# Patient Record
Sex: FEMALE | Race: WHITE
[De-identification: names, ages, dates, MRNs, and addresses within clinical notes are randomized per-mention and may not be internally consistent; named-entity substitution may affect disease eponyms.]

---

## 2017-01-28 ENCOUNTER — HOSPITAL ENCOUNTER (EMERGENCY)
Dept: HOSPITAL 62 - ER | Age: 37
Discharge: HOME | End: 2017-01-28
Payer: COMMERCIAL

## 2017-01-28 VITALS — SYSTOLIC BLOOD PRESSURE: 142 MMHG | DIASTOLIC BLOOD PRESSURE: 55 MMHG

## 2017-01-28 DIAGNOSIS — H10.31: Primary | ICD-10-CM

## 2017-01-28 DIAGNOSIS — J34.89: ICD-10-CM

## 2017-01-28 PROCEDURE — 99283 EMERGENCY DEPT VISIT LOW MDM: CPT

## 2017-01-28 NOTE — ER DOCUMENT REPORT
ED Eye Complaint





- General


Chief Complaint: Eye Pain


Stated Complaint: EYE PAIN,CONGESTION


Mode of Arrival: Ambulatory


TRAVEL OUTSIDE OF THE [place] IN LAST 30 DAYS: No





- Related Data


Allergies/Adverse Reactions: 


 





codeine Allergy (Verified 01/28/17 18:08)


 











Past Medical History





- General


Information source: Patient





- Social History


Smoking Status: Never Smoker


Chew tobacco use (# tins/day): No


Frequency of alcohol use: None


Drug Abuse: None


Family History: Reviewed & Not Pertinent


Patient has suicidal ideation: No


Patient has homicidal ideation: No


Renal/ Medical History: Denies: Hx Peritoneal Dialysis


Psychiatric Medical History: Reports: Hx Anxiety


Past Surgical History: Reports: Hx Orthopedic Surgery - Bilateral knees





- Immunizations


Hx Diphtheria, Pertussis, Tetanus Vaccination: No





Physical Exam





- Vital signs


Vitals: 





 











Temp Pulse Resp BP Pulse Ox


 


 98.2 F   88   20   153/95 H  97 


 


 01/28/17 17:45  01/28/17 17:45  01/28/17 17:45  01/28/17 17:45  01/28/17 17:45














- HEENT


Head: Normocephalic, Atraumatic


Eyes: No: Pale conjunctiva, Periorbital ecchymosis, Periorbital edema


Conjunctiva: Injected


Cornea: Normal.  No: Corneal abrasion, Corneal ulcer, Embedded foreign body, 

Flourescein stain uptake, Opacified, Superficial foreign body


Extraocular movements intact: Yes


Eyelashes: Normal


Pupils: PERRL


Visual acuity- Right eye: 20/50


Visual acuity- Left eye: 20/25


Visual acuity- Both eyes: 20/25


Corrective lenses worn: No


Fundascopic: Normal.  No: Retinal detachment, Retinal hemorrhage


Visual fields normal: Yes


Ears: Normal


External canal: Normal


Tympanic membrane: Normal


Sinus: Frontal, Maxillary, Tenderness


Nasal: Clear rhinorrhea.  No: Bloody discharge, Ecchymosis, Epistaxis, Purulent 

discharge, Swelling


Pharynx: Normal.  No: Erythema, Exudate, Peritonsillar abscess, Post nasal 

drainage, Retropharyngeal abscess


Neck: Normal.  No: Anterior cervical chain, Posterior cervical chain, 

Lymphadenopathy


Notes: 


no pain to palpation of the temporal arteries





Course





- Re-evaluation


Re-evalutation: 





01/28/17 20:38


no evidence of PITTS or RVO do to intact vision. given drainage and appearance, 

will treat for conjunctivitis and instruc ther to follow up with opthamology on 

tuesday





- Vital Signs


Vital signs: 





 











Temp Pulse Resp BP Pulse Ox


 


 98.2 F   88   20   153/95 H  97 


 


 01/28/17 17:45  01/28/17 17:45  01/28/17 17:45  01/28/17 17:45  01/28/17 17:45














Discharge





- Discharge


Clinical Impression: 


Conjunctivitis


Qualifiers:


 Conjunctivitis type: acute Acute conjunctivitis type: unspecified Laterality: 

right Qualified Code(s): H10.31 - Unspecified acute conjunctivitis, right eye





Condition: Good


Disposition: HOME, SELF-CARE


Additional Instructions: 


Conjunctivitis





     You have an infection in your eye, commonly known as "pink eye." 

Conjunctivitis causes redness, mild discomfort, itching, and mattering on the 

eyelids.  It is very contagious, so you must be careful to wash your hands 

after touching your face so you don't pass the infection on to others.


     Conjunctivitis is caused by both viruses and bacteria.  It usually 

responds quickly to treatment with antibiotic drops.  These should be placed in 

the eye as prescribed (usually every three to four hours while you're awake).  

If you wear contact lenses, don't put them in your eyes until the infection is 

cleared and you are no longer using the drops (unless your doctor advises you 

otherwise).


     Should you develop increasing eye pain, severe swelling, decreased vision, 

or fail to improve as expected, please return for re-examination.











Continue taking amoxicillin was prescribed for sinus infection.  Follow-up with 

ophthalmology as scheduled


Prescriptions: 


Ketorolac Tromethamine [Acular] 1 drop OD QIDP PRN #5 ml


 PRN Reason: 


Ofloxacin 5 ml OP QID #1 bottle


Forms:  Elevated Blood Pressure

## 2017-01-28 NOTE — ER DOCUMENT REPORT
ED Medical Screen (RME)





- General


Stated Complaint: EYE PAIN,CONGESTION


Time seen by provider: 18:06


Mode of Arrival: Ambulatory


Information source: Patient


Notes: 


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 

haziness to her right eye.  It originally treated for pinkeye and then a sinus 

infection twice by [facility] over the past 2 weeks.  every morning it 

is crusted and pus has come out from the medial canthus of the eye.  sHe has an 

appointment with an eye doctor on Tuesday.  The whole right side of her face 

hurts.





I have greeted and performed a rapid initial assessment of this patient.  A 

comprehensive ED assessment, evaluation of the patient, analysis of test results

, and completion of the medical decision making process will be contacted by 

additional ED providers.


TRAVEL OUTSIDE OF THE [place] IN LAST 30 DAYS: No





- Related Data


Allergies/Adverse Reactions: 


 





codeine Allergy (Verified 12/07/16 11:53)


 











Past Medical History


Psychiatric Medical History: Reports: Hx Anxiety





- Immunizations


Hx Diphtheria, Pertussis, Tetanus Vaccination: No





Physical Exam





- Vital signs


Vitals: 





 











Temp Pulse Resp BP Pulse Ox


 


 98.2 F   88   20   153/95 H  97 


 


 01/28/17 17:45  01/28/17 17:45  01/28/17 17:45  01/28/17 17:45  01/28/17 17:45














Course





- Vital Signs


Vital signs: 





 











Temp Pulse Resp BP Pulse Ox


 


 98.2 F   88   20   153/95 H  97 


 


 01/28/17 17:45  01/28/17 17:45  01/28/17 17:45  01/28/17 17:45  01/28/17 17:45

## 2017-09-18 ENCOUNTER — HOSPITAL ENCOUNTER (EMERGENCY)
Dept: HOSPITAL 62 - ER | Age: 37
Discharge: HOME | End: 2017-09-18
Payer: COMMERCIAL

## 2017-09-18 VITALS — DIASTOLIC BLOOD PRESSURE: 70 MMHG | SYSTOLIC BLOOD PRESSURE: 124 MMHG

## 2017-09-18 DIAGNOSIS — I10: ICD-10-CM

## 2017-09-18 DIAGNOSIS — R51: ICD-10-CM

## 2017-09-18 DIAGNOSIS — R07.89: Primary | ICD-10-CM

## 2017-09-18 DIAGNOSIS — Z88.5: ICD-10-CM

## 2017-09-18 DIAGNOSIS — Z86.718: ICD-10-CM

## 2017-09-18 LAB
ALBUMIN SERPL-MCNC: 4.1 G/DL (ref 3.5–5)
ALP SERPL-CCNC: 87 U/L (ref 38–126)
ALT SERPL-CCNC: 29 U/L (ref 9–52)
ANION GAP SERPL CALC-SCNC: 11 MMOL/L (ref 5–19)
AST SERPL-CCNC: 18 U/L (ref 14–36)
BASOPHILS # BLD AUTO: 0 10^3/UL (ref 0–0.2)
BASOPHILS NFR BLD AUTO: 0.3 % (ref 0–2)
BILIRUB DIRECT SERPL-MCNC: 0.3 MG/DL (ref 0–0.4)
BILIRUB SERPL-MCNC: 0.4 MG/DL (ref 0.2–1.3)
BUN SERPL-MCNC: 26 MG/DL (ref 7–20)
CALCIUM: 9.3 MG/DL (ref 8.4–10.2)
CHLORIDE SERPL-SCNC: 102 MMOL/L (ref 98–107)
CK MB SERPL-MCNC: 1.16 NG/ML (ref ?–4.55)
CK SERPL-CCNC: 131 U/L (ref 30–135)
CO2 SERPL-SCNC: 28 MMOL/L (ref 22–30)
CREAT SERPL-MCNC: 0.98 MG/DL (ref 0.52–1.25)
EOSINOPHIL # BLD AUTO: 0.2 10^3/UL (ref 0–0.6)
EOSINOPHIL NFR BLD AUTO: 2.7 % (ref 0–6)
ERYTHROCYTE [DISTWIDTH] IN BLOOD BY AUTOMATED COUNT: 14.6 % (ref 11.5–14)
GLUCOSE SERPL-MCNC: 89 MG/DL (ref 75–110)
HCT VFR BLD CALC: 36.4 % (ref 36–47)
HGB BLD-MCNC: 12.3 G/DL (ref 12–15.5)
HGB HCT DIFFERENCE: 0.5
LYMPHOCYTES # BLD AUTO: 1.8 10^3/UL (ref 0.5–4.7)
LYMPHOCYTES NFR BLD AUTO: 28.7 % (ref 13–45)
MCH RBC QN AUTO: 29 PG (ref 27–33.4)
MCHC RBC AUTO-ENTMCNC: 33.8 G/DL (ref 32–36)
MCV RBC AUTO: 86 FL (ref 80–97)
MONOCYTES # BLD AUTO: 0.4 10^3/UL (ref 0.1–1.4)
MONOCYTES NFR BLD AUTO: 5.9 % (ref 3–13)
NEUTROPHILS # BLD AUTO: 4 10^3/UL (ref 1.7–8.2)
NEUTS SEG NFR BLD AUTO: 62.4 % (ref 42–78)
POTASSIUM SERPL-SCNC: 3.3 MMOL/L (ref 3.6–5)
PROT SERPL-MCNC: 7.3 G/DL (ref 6.3–8.2)
RBC # BLD AUTO: 4.25 10^6/UL (ref 3.72–5.28)
SODIUM SERPL-SCNC: 141.3 MMOL/L (ref 137–145)
TROPONIN I SERPL-MCNC: < 0.012 NG/ML
WBC # BLD AUTO: 6.4 10^3/UL (ref 4–10.5)

## 2017-09-18 PROCEDURE — 99285 EMERGENCY DEPT VISIT HI MDM: CPT

## 2017-09-18 PROCEDURE — 85379 FIBRIN DEGRADATION QUANT: CPT

## 2017-09-18 PROCEDURE — 96375 TX/PRO/DX INJ NEW DRUG ADDON: CPT

## 2017-09-18 PROCEDURE — 93010 ELECTROCARDIOGRAM REPORT: CPT

## 2017-09-18 PROCEDURE — 82553 CREATINE MB FRACTION: CPT

## 2017-09-18 PROCEDURE — 82550 ASSAY OF CK (CPK): CPT

## 2017-09-18 PROCEDURE — 93005 ELECTROCARDIOGRAM TRACING: CPT

## 2017-09-18 PROCEDURE — 36415 COLL VENOUS BLD VENIPUNCTURE: CPT

## 2017-09-18 PROCEDURE — 84484 ASSAY OF TROPONIN QUANT: CPT

## 2017-09-18 PROCEDURE — 96374 THER/PROPH/DIAG INJ IV PUSH: CPT

## 2017-09-18 PROCEDURE — 96361 HYDRATE IV INFUSION ADD-ON: CPT

## 2017-09-18 PROCEDURE — 71010: CPT

## 2017-09-18 PROCEDURE — 80053 COMPREHEN METABOLIC PANEL: CPT

## 2017-09-18 PROCEDURE — 83735 ASSAY OF MAGNESIUM: CPT

## 2017-09-18 PROCEDURE — 85025 COMPLETE CBC W/AUTO DIFF WBC: CPT

## 2017-09-18 NOTE — RADIOLOGY REPORT (SQ)
EXAM DESCRIPTION:  CHEST SINGLE VIEW



COMPLETED DATE/TIME:  9/18/2017 4:48 pm



REASON FOR STUDY:  chest pain



COMPARISON:  None.



EXAM PARAMETERS:  NUMBER OF VIEWS: One view.

TECHNIQUE: Single frontal radiographic view of the chest acquired.

RADIATION DOSE: NA

LIMITATIONS: None.



FINDINGS:  LUNGS AND PLEURA: No opacities, masses or pneumothorax. No pleural effusion.

MEDIASTINUM AND HILAR STRUCTURES: No masses.  Contour normal.

HEART AND VASCULAR STRUCTURES: Heart normal in size.  Normal vasculature.

BONES: No acute findings.

HARDWARE: None in the chest.

OTHER: No other significant finding.



IMPRESSION:  NO ACUTE RADIOGRAPHIC FINDING IN THE CHEST.



TECHNICAL DOCUMENTATION:  JOB ID:  [id number]

## 2017-09-18 NOTE — ER DOCUMENT REPORT
ED Medical Screen (RME)





- General


Chief Complaint: Chest Pain


Stated Complaint: CHEST PAIN


Time Seen by Provider: 17 15:47


Mode of Arrival: Ambulatory


Information source: Patient


Notes: 





37-year-old female history of anxiety presents with complaints of chest pain 

over the past week pain to left arm.  Patient admits to history of a DVT after 

delivery of twins. 





Patient notes similar chest pain 4 years ago and was admitted overnight had a 

stress test was normal








I have greeted and performed a rapid initial assessment of this patient.  A 

comprehensive ED assessment and evaluation of the patient, analysis of test 

results and completion of the medical decision making process will be conducted 

by additional ED providers.





PHYSICAL EXAMINATION:





GENERAL: Well-appearing, well-nourished and in no acute distress.





HEAD: Atraumatic, normocephalic.





EYES: Pupils equal round extraocular movements intact,  conjunctiva are normal.





ENT: Nares patent





NECK: Normal range of motion





LUNGS: No respiratory distress





Musculoskeletal: Normal range of motion





NEUROLOGICAL:  Normal speech, normal gait. 





PSYCH: Normal mood, normal affect.





SKIN: Warm, Dry, normal turgor, no rashes or lesions noted.


TRAVEL OUTSIDE OF THE [place] IN LAST 30 DAYS: No





- Related Data


Allergies/Adverse Reactions: 


 





codeine Allergy (Verified 17 15:34)


 











Past Medical History





- Social History


Chew tobacco use (# tins/day): No


Frequency of alcohol use: None


Drug Abuse: None





- Past Medical History


Cardiac Medical History: Reports: Hx Hypertension


Renal/ Medical History: Denies: Hx Peritoneal Dialysis


Psychiatric Medical History: Reports: Hx Anxiety


Past Surgical History: Reports: Hx  Section - 2, Hx Orthopedic Surgery 

- Bilateral knees and rib removal





- Immunizations


Hx Diphtheria, Pertussis, Tetanus Vaccination: No





Physical Exam





- Vital signs


Vitals: 





 











Temp Pulse Resp BP Pulse Ox


 


 98.1 F   78   18   149/90 H  99 


 


 17 15:37  17 15:37  17 15:37  17 15:37  17 15:37














Course





- Vital Signs


Vital signs: 





 











Temp Pulse Resp BP Pulse Ox


 


 98.1 F   78   18   149/90 H  99 


 


 17 15:37  17 15:37  17 15:37  17 15:37  17 15:37

## 2017-09-18 NOTE — ER DOCUMENT REPORT
ED General





- General


Chief Complaint: Chest Pain


Stated Complaint: CHEST PAIN


Time Seen by Provider: 17 15:47


Mode of Arrival: Ambulatory


TRAVEL OUTSIDE OF THE [place] IN LAST 30 DAYS: No





- HPI


Patient complains to provider of: Left shoulder pain left chest pain


Notes: 





Patient presents today for left-sided chest pain going on her left arm patient 

states ongoing since Saturday patient has a history of DVT in the past 

concerned about blood clot.  Patient otherwise also states since Saturday 

having a headache frontal region goes to the back of her neck.  Denies any 

fever chills nausea vomiting diarrhea denies any trauma or recent travel.





- Related Data


Allergies/Adverse Reactions: 


 





codeine Allergy (Verified 17 15:34)


 











Past Medical History





- General


Information source: Patient





- Social History


Smoking Status: Never Smoker


Chew tobacco use (# tins/day): No


Frequency of alcohol use: None


Drug Abuse: None


Family History: Reviewed & Not Pertinent


Patient has suicidal ideation: No





- Past Medical History


Cardiac Medical History: Reports: Hx Hypertension


Renal/ Medical History: Denies: Hx Peritoneal Dialysis


Psychiatric Medical History: Reports: Hx Anxiety


Past Surgical History: Reports: Hx  Section - 2, Hx Orthopedic Surgery 

- Bilateral knees and rib removal





- Immunizations


Hx Diphtheria, Pertussis, Tetanus Vaccination: No





Review of Systems





- Review of Systems


Constitutional: No symptoms reported


EENT: No symptoms reported


Cardiovascular: Chest pain


Respiratory: No symptoms reported


Gastrointestinal: No symptoms reported


Genitourinary: No symptoms reported


Female Genitourinary: No symptoms reported


Musculoskeletal: No symptoms reported


Skin: No symptoms reported


Hematologic/Lymphatic: No symptoms reported


Neurological/Psychological: Headaches


-: Yes All other systems reviewed and negative





Physical Exam





- Vital signs


Vitals: 


 











Temp Pulse Resp BP Pulse Ox


 


 98.1 F   78   18   149/90 H  99 


 


 17 15:37  17 15:37  17 15:37  17 15:37  17 15:37











Interpretation: Normal





- General


General appearance: Appears well, Alert





- HEENT


Head: Normocephalic, Atraumatic


Eyes: Normal


Pupils: PERRL





- Respiratory


Respiratory status: No respiratory distress


Chest status: Nontender


Breath sounds: Normal


Chest palpation: Normal





- Cardiovascular


Rhythm: Regular


Heart sounds: Normal auscultation


Murmur: No





- Abdominal


Inspection: Normal


Distension: No distension


Bowel sounds: Normal


Tenderness: Nontender


Organomegaly: No organomegaly





- Back


Back: Normal, Nontender





- Extremities


General upper extremity: Normal inspection, Nontender, Normal color, Normal ROM

, Normal temperature


General lower extremity: Normal inspection, Nontender, Normal color, Normal ROM

, Normal temperature, Normal weight bearing.  No: Jefe's sign





- Neurological


Neuro grossly intact: Yes


Cognition: Normal


Orientation: AAOx4


Zi Coma Scale Eye Opening: Spontaneous


[place] Coma Scale Verbal: Oriented


[place] Coma Scale Motor: Obeys Commands


[place] Coma Scale Total: 15


Speech: Normal


Motor strength normal: LUE, RUE, LLE, RLE


Sensory: Normal





- Psychological


Associated symptoms: Normal affect, Normal mood





- Skin


Skin Temperature: Warm


Skin Moisture: Dry


Skin Color: Normal





Course





- Re-evaluation


Re-evalutation: 





17 19:09


The patient has atypical chest pain as the patient's chest pain is not 

suggestive of pulmonary embolus, cardiac ischemia, aortic dissection, or other 

serious etiology. Given the extremely low risk of these diagnoses further 

testing and evaluation for these possibilities does not appear to be indicated 

at this time. The patient has been instructed to return if the symptoms worsen 

or change in any way.





- Vital Signs


Vital signs: 


 











Temp Pulse Resp BP Pulse Ox


 


 97.9 F   78   15   124/70   100 


 


 17 18:57  17 15:37  17 18:57  17 18:57  17 18:57














- Laboratory


Result Diagrams: 


 17 16:56





 17 16:56


Laboratory results interpreted by me: 


 











  17





  16:56 16:56


 


RDW  14.6 H 


 


Potassium   3.3 L


 


BUN   26 H














Discharge





- Discharge


Clinical Impression: 


 Chest wall pain





Headache


Qualifiers:


 Headache type: unspecified Headache chronicity pattern: unspecified pattern 

Intractability: not intractable Qualified Code(s): R51 - Headache





Condition: Good


Disposition: HOME, SELF-CARE


Instructions:  Anti-Inflammatory Medication ([facility]), Chest Wall Pain ([facility]), Chest 

Pain of Unclear Cause ([facility]), Tension Headache ([facility])


Additional Instructions: 


Your laboratory studies today did not show any signs of cardiac ischemia nor 

blood clot no infection of the lung.  Highly recommend he follow-up with your 

primary care physician return to the ER symptoms worsen.  He may take the 

medication as prescribed for your headache.


Prescriptions: 


Ibuprofen [Motrin 600 Mg Tablet] 600 mg PO TID #30 tablet


Ondansetron [Zofran Odt 4 mg Tablet] 4 mg PO Q6 #30 tab.rapdis


Prochlorperazine Maleate [Compazine] 5 mg PO Q6 #30 tablet


Forms:  Return to Work

## 2019-03-22 ENCOUNTER — HOSPITAL ENCOUNTER (EMERGENCY)
Dept: HOSPITAL 62 - ER | Age: 39
LOS: 1 days | Discharge: HOME | End: 2019-03-23
Payer: SELF-PAY

## 2019-03-22 DIAGNOSIS — R42: ICD-10-CM

## 2019-03-22 DIAGNOSIS — R07.9: Primary | ICD-10-CM

## 2019-03-22 DIAGNOSIS — I10: ICD-10-CM

## 2019-03-22 DIAGNOSIS — Z88.6: ICD-10-CM

## 2019-03-22 DIAGNOSIS — R00.2: ICD-10-CM

## 2019-03-22 DIAGNOSIS — R11.0: ICD-10-CM

## 2019-03-22 PROCEDURE — 93010 ELECTROCARDIOGRAM REPORT: CPT

## 2019-03-22 PROCEDURE — 84484 ASSAY OF TROPONIN QUANT: CPT

## 2019-03-22 PROCEDURE — 93005 ELECTROCARDIOGRAM TRACING: CPT

## 2019-03-22 PROCEDURE — 85025 COMPLETE CBC W/AUTO DIFF WBC: CPT

## 2019-03-22 PROCEDURE — 80053 COMPREHEN METABOLIC PANEL: CPT

## 2019-03-22 PROCEDURE — 84443 ASSAY THYROID STIM HORMONE: CPT

## 2019-03-22 PROCEDURE — 36415 COLL VENOUS BLD VENIPUNCTURE: CPT

## 2019-03-22 PROCEDURE — 82550 ASSAY OF CK (CPK): CPT

## 2019-03-22 PROCEDURE — 84703 CHORIONIC GONADOTROPIN ASSAY: CPT

## 2019-03-22 PROCEDURE — 82553 CREATINE MB FRACTION: CPT

## 2019-03-22 PROCEDURE — 99284 EMERGENCY DEPT VISIT MOD MDM: CPT

## 2019-03-22 PROCEDURE — 71045 X-RAY EXAM CHEST 1 VIEW: CPT

## 2019-03-22 NOTE — RADIOLOGY REPORT (SQ)
EXAM DESCRIPTION: 



XR CHEST 1 VIEW



COMPLETED DATE/TME:  03/22/2019 22:05



CLINICAL HISTORY: 



38 years, Female, chest pain



COMPARISON:

9/18/2017 chest



NUMBER OF VIEWS:

1



TECHNIQUE:

Portable chest



LIMITATIONS:

None.



FINDINGS:



Heart size normal. Lungs clear. No pneumothorax



IMPRESSION:



Negative chest

 



copyright 2011 [employer]- All Rights Reserved

## 2019-03-22 NOTE — EKG REPORT
SEVERITY:- BORDERLINE ECG -

SINUS RHYTHM

BORDERLINE PROLONGED QT INTERVAL

:

Confirmed by: Beatriz Peace 22-Mar-2019 23:06:27

## 2019-03-23 VITALS — DIASTOLIC BLOOD PRESSURE: 84 MMHG | SYSTOLIC BLOOD PRESSURE: 150 MMHG

## 2019-03-23 LAB
ADD MANUAL DIFF: NO
ALBUMIN SERPL-MCNC: 4.7 G/DL (ref 3.5–5)
ALP SERPL-CCNC: 106 U/L (ref 38–126)
ALT SERPL-CCNC: 20 U/L (ref 9–52)
ANION GAP SERPL CALC-SCNC: 14 MMOL/L (ref 5–19)
AST SERPL-CCNC: 31 U/L (ref 14–36)
BASOPHILS # BLD AUTO: 0.1 10^3/UL (ref 0–0.2)
BASOPHILS NFR BLD AUTO: 0.9 % (ref 0–2)
BILIRUB DIRECT SERPL-MCNC: 0.4 MG/DL (ref 0–0.4)
BILIRUB SERPL-MCNC: 0.6 MG/DL (ref 0.2–1.3)
BUN SERPL-MCNC: 20 MG/DL (ref 7–20)
CALCIUM: 10.1 MG/DL (ref 8.4–10.2)
CHLORIDE SERPL-SCNC: 97 MMOL/L (ref 98–107)
CK MB SERPL-MCNC: 0.75 NG/ML (ref ?–4.55)
CK SERPL-CCNC: 108 U/L (ref 30–135)
CO2 SERPL-SCNC: 27 MMOL/L (ref 22–30)
EOSINOPHIL # BLD AUTO: 0.2 10^3/UL (ref 0–0.6)
EOSINOPHIL NFR BLD AUTO: 3 % (ref 0–6)
ERYTHROCYTE [DISTWIDTH] IN BLOOD BY AUTOMATED COUNT: 14.5 % (ref 11.5–14)
GLUCOSE SERPL-MCNC: 100 MG/DL (ref 75–110)
HCT VFR BLD CALC: 40.2 % (ref 36–47)
HGB BLD-MCNC: 13.7 G/DL (ref 12–15.5)
LYMPHOCYTES # BLD AUTO: 2 10^3/UL (ref 0.5–4.7)
LYMPHOCYTES NFR BLD AUTO: 25.2 % (ref 13–45)
MCH RBC QN AUTO: 29.3 PG (ref 27–33.4)
MCHC RBC AUTO-ENTMCNC: 34 G/DL (ref 32–36)
MCV RBC AUTO: 86 FL (ref 80–97)
MONOCYTES # BLD AUTO: 0.4 10^3/UL (ref 0.1–1.4)
MONOCYTES NFR BLD AUTO: 4.6 % (ref 3–13)
NEUTROPHILS # BLD AUTO: 5.3 10^3/UL (ref 1.7–8.2)
NEUTS SEG NFR BLD AUTO: 66.3 % (ref 42–78)
PLATELET # BLD: 279 10^3/UL (ref 150–450)
POTASSIUM SERPL-SCNC: 3.6 MMOL/L (ref 3.6–5)
PROT SERPL-MCNC: 8.3 G/DL (ref 6.3–8.2)
RBC # BLD AUTO: 4.66 10^6/UL (ref 3.72–5.28)
SODIUM SERPL-SCNC: 137.8 MMOL/L (ref 137–145)
TOTAL CELLS COUNTED % (AUTO): 100 %
TROPONIN I SERPL-MCNC: < 0.012 NG/ML
WBC # BLD AUTO: 8 10^3/UL (ref 4–10.5)

## 2019-03-23 NOTE — ER DOCUMENT REPORT
ED General





- General


Chief Complaint: Chest Pain > 30


Stated Complaint: CHEST PAIN,VOMITING,LEFT ARM PAIN


Time Seen by Provider: 19 21:54


Primary Care Provider: 


VIRGILIO FLORES MD [Primary Care Provider] - Follow up as needed


Mode of Arrival: Ambulatory


Notes: 





Patient is a 38-year-old female that comes to the emergency department for chief

complaint of episodes where she will feel the pain in her chest that is sharp 

that radiates towards her left armpit, she states that when this happens she 

gets flushed, her legs feel weak, she feels lightheaded, she sometimes feels 

nauseated.  She states when it happened earlier today she threw up.  She denies 

that it was associated with food today but it has been associated with food in 

the past.  She denies abdominal pain or flank pain.  She denies fever or chills.

 She states this has been going on for several weeks, but this was the first 

time she threw up with it.  She does have a history of panic attacks, 

hypertension (medicated with 3 medications), she denies smoking, she denies 

personal or family history of cardiac disease or MI.  She has seen primary care 

about this, she has a pending cardiology referral already.  She denies current 

symptoms.  She states that symptoms are constant when they come, are present for

several hours, and then resolve on their own.


TRAVEL OUTSIDE OF THE [place] IN LAST 30 DAYS: No





- Related Data


Allergies/Adverse Reactions: 


                                        





codeine Allergy (Verified 17 15:34)


   











Past Medical History





- General


Information source: Patient





- Social History


Smoking Status: Never Smoker


Chew tobacco use (# tins/day): No


Frequency of alcohol use: None


Drug Abuse: None


Lives with: Family


Family History: Reviewed & Not Pertinent


Patient has suicidal ideation: No


Patient has homicidal ideation: No





- Past Medical History


Cardiac Medical History: Reports: Hx Hypertension


Renal/ Medical History: Denies: Hx Peritoneal Dialysis


Psychiatric Medical History: Reports: Hx Anxiety


Past Surgical History: Reports: Hx  Section - 2, Hx Orthopedic Surgery -

Bilateral knees and rib removal





- Immunizations


Hx Diphtheria, Pertussis, Tetanus Vaccination: No





Review of Systems





- Review of Systems


Constitutional: No symptoms reported


EENT: No symptoms reported


Cardiovascular: See HPI


Respiratory: See HPI


Gastrointestinal: See HPI


Genitourinary: No symptoms reported


Female Genitourinary: No symptoms reported


Musculoskeletal: No symptoms reported


Skin: No symptoms reported


Hematologic/Lymphatic: No symptoms reported


Neurological/Psychological: See HPI





Physical Exam





- Vital signs


Vitals: 


                                        











Temp Pulse Resp BP Pulse Ox


 


 98.1 F   84   17   147/76 H  98 


 


 19 22:27  19 22:27  19 22:27  19 22:27  19 22:27














- Notes


Notes: 





GENERAL: Alert, interacts well. No acute distress.


HEAD: Normocephalic, atraumatic.


EYES: Pupils equal, round, and reactive to light. Extraocular movements intact.


ENT: Oral mucosa moist, tongue midline. Oropharynx unremarkable. Airway patent. 

Nares patent, no nasal septal hematoma, TM's intact.


NECK: Full range of motion. Supple. Trachea midline.


LUNGS: Clear to auscultation bilaterally, no wheezes, rales, or rhonchi. No 

respiratory distress.


HEART: Regular rate and rhythm. No murmur


ABDOMEN: Soft, non-tender. Non-distended. Bowel sounds present in all 4 

quadrants.


GENITOURINARY: Deferred


EXTREMITIES: Moves all 4 extremities spontaneously. No edema, normal radial and 

dorsalis pedis pulses bilaterally. No cyanosis.


BACK: no cervical, thoracic, lumbar midline tenderness. No saddle anesthesia, 

normal distal neurovascular exam. 


NEUROLOGICAL: Alert and oriented x3. Normal speech. [cranial nerves II through 

XII grossly intact]. 


PSYCH: Normal affect, normal mood.


SKIN: Warm, dry, normal turgor. No rashes or lesions noted.





Course





- Re-evaluation


Re-evalutation: 





19 04:07


EKG sinus rhythm with no T wave inversions or changes in consecutive leads.  

Normal axis.  MA interval is 168, QTC borderline prolonged at 495.





Patient's heart score is only 1 (obesity, hypertension for one point).





Chest x-ray unremarkable.  Initial troponin negative.  CBC, chemistry 

unremarkable.  On the monitor patient has no ectopy or concerning findings.  She

has no symptoms on my evaluation.  She has no complaints.  I discussed with 

patient.  Decision was made to perform TSH and a repeat troponin, if these are 

negative she will follow-up with cardiology for additional evaluation including 

problem Holter or event monitor.  Based on her symptoms that will last for a 

long time I do not think that she is having V. tach.  Patient also has a anxiety

disorder.  Patient also drinks a lot of caffeine, regularly drinks Pepsi during 

the day.  I recommended she cut down the caffeine because these can worsen 

palpitations.  Discussed return precautions with patient and significant other 

at bedside.  They state satisfaction and agreement.  Stable at time of di

scharge.














- Vital Signs


Vital signs: 


                                        











Temp Pulse Resp BP Pulse Ox


 


 98.1 F   84   15   150/84 H  100 


 


 19 04:26  19 22:27  19 04:01  19 04:01  19 04:01














- Laboratory


Result Diagrams: 


                                 19 00:49





                                 19 00:49


Laboratory results interpreted by me: 


                                        











  19





  00:49 00:49


 


RDW  14.5 H 


 


Chloride   97 L


 


Total Protein   8.3 H














Discharge





- Discharge


Clinical Impression: 


 Palpitations





Chest pain


Qualifiers:


 Chest pain type: unspecified Qualified Code(s): R07.9 - Chest pain, unspecified





Condition: Stable


Disposition: HOME, SELF-CARE


Additional Instructions: 


Your workup at this time is reassuring including cardiac markers, chest x-ray, 

and thyroid screen.


I recommend that you follow-up with your cardiology referral for additional 

management including possible monitor use.


It is possible there is a gastrointestinal source of the pain such as gastritis 

as well, consider over-the-counter medication such as famotidine.


Return if you worsen including passing out, severe worsening pain, difficulty 

breathing, or any other concerning or worsening symptoms.


Forms:  Return to Work


Referrals: 


VIRGILIO FLORES MD [Primary Care Provider] - Follow up as needed

## 2020-03-01 ENCOUNTER — HOSPITAL ENCOUNTER (EMERGENCY)
Dept: HOSPITAL 62 - ER | Age: 40
Discharge: HOME | End: 2020-03-01
Payer: COMMERCIAL

## 2020-03-01 VITALS — DIASTOLIC BLOOD PRESSURE: 70 MMHG | SYSTOLIC BLOOD PRESSURE: 134 MMHG

## 2020-03-01 DIAGNOSIS — I10: ICD-10-CM

## 2020-03-01 DIAGNOSIS — M54.9: ICD-10-CM

## 2020-03-01 DIAGNOSIS — R10.9: Primary | ICD-10-CM

## 2020-03-01 DIAGNOSIS — Z88.8: ICD-10-CM

## 2020-03-01 LAB
ADD MANUAL DIFF: NO
ALBUMIN SERPL-MCNC: 4.2 G/DL (ref 3.5–5)
ALP SERPL-CCNC: 81 U/L (ref 38–126)
ANION GAP SERPL CALC-SCNC: 8 MMOL/L (ref 5–19)
APPEARANCE UR: CLEAR
APTT PPP: YELLOW S
AST SERPL-CCNC: 20 U/L (ref 14–36)
BASOPHILS # BLD AUTO: 0 10^3/UL (ref 0–0.2)
BASOPHILS NFR BLD AUTO: 0.5 % (ref 0–2)
BILIRUB DIRECT SERPL-MCNC: 0.3 MG/DL (ref 0–0.4)
BILIRUB SERPL-MCNC: 0.4 MG/DL (ref 0.2–1.3)
BILIRUB UR QL STRIP: NEGATIVE
BUN SERPL-MCNC: 20 MG/DL (ref 7–20)
CALCIUM: 9 MG/DL (ref 8.4–10.2)
CHLORIDE SERPL-SCNC: 101 MMOL/L (ref 98–107)
CO2 SERPL-SCNC: 29 MMOL/L (ref 22–30)
EOSINOPHIL # BLD AUTO: 0.2 10^3/UL (ref 0–0.6)
EOSINOPHIL NFR BLD AUTO: 5.5 % (ref 0–6)
ERYTHROCYTE [DISTWIDTH] IN BLOOD BY AUTOMATED COUNT: 15.2 % (ref 11.5–14)
GLUCOSE SERPL-MCNC: 87 MG/DL (ref 75–110)
GLUCOSE UR STRIP-MCNC: NEGATIVE MG/DL
HCT VFR BLD CALC: 37.3 % (ref 36–47)
HGB BLD-MCNC: 12.8 G/DL (ref 12–15.5)
KETONES UR STRIP-MCNC: NEGATIVE MG/DL
LYMPHOCYTES # BLD AUTO: 1.1 10^3/UL (ref 0.5–4.7)
LYMPHOCYTES NFR BLD AUTO: 29.5 % (ref 13–45)
MCH RBC QN AUTO: 29.4 PG (ref 27–33.4)
MCHC RBC AUTO-ENTMCNC: 34.4 G/DL (ref 32–36)
MCV RBC AUTO: 85 FL (ref 80–97)
MONOCYTES # BLD AUTO: 0.4 10^3/UL (ref 0.1–1.4)
MONOCYTES NFR BLD AUTO: 11.3 % (ref 3–13)
NEUTROPHILS # BLD AUTO: 2 10^3/UL (ref 1.7–8.2)
NEUTS SEG NFR BLD AUTO: 53.2 % (ref 42–78)
NITRITE UR QL STRIP: NEGATIVE
PH UR STRIP: 5 [PH] (ref 5–9)
PLATELET # BLD: 176 10^3/UL (ref 150–450)
POTASSIUM SERPL-SCNC: 4.1 MMOL/L (ref 3.6–5)
PROT SERPL-MCNC: 7.5 G/DL (ref 6.3–8.2)
PROT UR STRIP-MCNC: NEGATIVE MG/DL
RBC # BLD AUTO: 4.37 10^6/UL (ref 3.72–5.28)
SP GR UR STRIP: 1.02
TOTAL CELLS COUNTED % (AUTO): 100 %
UROBILINOGEN UR-MCNC: NEGATIVE MG/DL (ref ?–2)
WBC # BLD AUTO: 3.7 10^3/UL (ref 4–10.5)

## 2020-03-01 PROCEDURE — 81001 URINALYSIS AUTO W/SCOPE: CPT

## 2020-03-01 PROCEDURE — 74176 CT ABD & PELVIS W/O CONTRAST: CPT

## 2020-03-01 PROCEDURE — 76705 ECHO EXAM OF ABDOMEN: CPT

## 2020-03-01 PROCEDURE — 85025 COMPLETE CBC W/AUTO DIFF WBC: CPT

## 2020-03-01 PROCEDURE — 80053 COMPREHEN METABOLIC PANEL: CPT

## 2020-03-01 PROCEDURE — 36415 COLL VENOUS BLD VENIPUNCTURE: CPT

## 2020-03-01 PROCEDURE — 99284 EMERGENCY DEPT VISIT MOD MDM: CPT

## 2020-03-01 PROCEDURE — 81025 URINE PREGNANCY TEST: CPT

## 2020-03-01 NOTE — ER DOCUMENT REPORT
ED Medical Screen (RME)





- General


Chief Complaint: Flank Pain


Stated Complaint: BACK PAIN


Time Seen by Provider: 20 12:36


Primary Care Provider: 


VIRGILIO FLORES MD [Primary Care Provider] - Follow up as needed


Notes: 





Patient is a 39-year-old female presents emergency department with a chief 

complaint of right flank pain.  Patient reports that over the past few weeks the

right flank pain now radiates into her right side.  Patient denies specific 

abdominal pain but states that the pain is starting to radiate into the abdomen.

 Patient reports feeling a fullness in her bladder and bladder discomfort.  Chanel

ent reports when she presses on the right side she does feel some relief.  

Patient does not have a history of kidney stones but states she is concerned 

that she may have a stone.  Patient denies fall or back injury.


TRAVEL OUTSIDE OF THE [place] IN LAST 30 DAYS: No





- Related Data


Allergies/Adverse Reactions: 


                                        





codeine Allergy (Verified 20 12:36)


   











Past Medical History





- Past Medical History


Cardiac Medical History: Reports: Hx Hypertension


Renal/ Medical History: Denies: Hx Peritoneal Dialysis


Psychiatric Medical History: Reports: Hx Anxiety


Past Surgical History: Reports: Hx  Section - 2, Hx Orthopedic Surgery -

Bilateral knees and rib removal





- Immunizations


Hx Diphtheria, Pertussis, Tetanus Vaccination: No





Physical Exam





- Abdominal


Distension: No distension


Bowel sounds: Normal


Tenderness: Nontender


Organomegaly: No organomegaly





Course





- Re-evaluation


Re-evalutation: 





20 12:43


Patient require a thorough abdominal exam once placed on a stretcher in a 

private room.  Will obtain basic labs as well as urinalysis.





I have greeted and performed a rapid initial assessment of this patient.  A 

comprehensive ED assessment and evaluation of the patient, analysis of test 

results and completion of the medical decision making process will be conducted 

by additional ED providers.





Doctor's Discharge





- Discharge


Referrals: 


VIRGILIO FLORES MD [Primary Care Provider] - Follow up as needed

## 2020-03-01 NOTE — RADIOLOGY REPORT (SQ)
EXAM DESCRIPTION:  U/S ABDOMEN LIMITED W/O DOP



COMPLETED DATE/TIME:  3/1/2020 3:36 pm



REASON FOR STUDY:  R flank/RUQ pain



COMPARISON:  None.



TECHNIQUE:  Dynamic and static grayscale images acquired of the abdomen and recorded on PACS. Additio
nal selected color Doppler and spectral images recorded.



LIMITATIONS:  None.



FINDINGS:  PANCREAS: No masses.  Visualized pancreatic duct normal caliber.

LIVER: No masses. Echotexture normal.

LIVER VASCULATURE: Normal directional flow of the main portal vein and hepatic veins.

GALLBLADDER: Somewhat distended.  No stones. Normal wall thickness. No pericholecystic fluid.

ULTRASOUND-DETECTED CHATMAN'S SIGN: Negative.

INTRAHEPATIC DUCTS AND COMMON DUCT: CBD and intrahepatic ducts normal caliber. No filling defects.

INFERIOR VENA CAVA: Normal flow.

AORTA: No aneurysm.

RIGHT KIDNEY:  Normal size. Normal echogenicity. No solid or suspicious masses. No hydronephrosis. No
 calcifications.

PERITONEAL AND RIGHT PLEURAL SPACE: No ascites or effusions.

OTHER: No other significant findings.



IMPRESSION:  NORMAL RIGHT UPPER QUADRANT ULTRASOUND.



TECHNICAL DOCUMENTATION:  JOB ID:  [id number]

 2011 [employer]- All Rights Reserved



Reading location - IP/workstation name: [email or web address]

## 2020-03-01 NOTE — ER DOCUMENT REPORT
ED GI/





- General


Chief Complaint: Flank Pain


Stated Complaint: BACK PAIN


Time Seen by Provider: 20 12:36


Primary Care Provider: 


VIRGILIO FLORES MD [Primary Care Provider] - Follow up as needed


Mode of Arrival: Ambulatory


Information source: Patient


Notes: 





Patient is a 39-year-old female presents emergency department with a chief 

complaint of right flank pain.  Patient reports that over the past few weeks the

right flank pain now radiates into her right side.  Patient denies specific 

abdominal pain but states that the pain is starting to radiate into the abdomen.

 Patient reports feeling a fullness in her bladder and bladder discomfort.  

Patient reports when she presses on the right side she does feel some relief.  

Patient does not have a history of kidney stones but states she is concerned 

that she may have a stone.  Patient denies fall or back injury.





TRAVEL OUTSIDE OF THE [place] IN LAST 30 DAYS: No





- Related Data


Allergies/Adverse Reactions: 


                                        





codeine Allergy (Verified 20 12:36)


   











Past Medical History





- General


Information source: Patient





- Social History


Smoking Status: Never Smoker


Chew tobacco use (# tins/day): No


Frequency of alcohol use: None


Drug Abuse: None


Family History: Reviewed & Not Pertinent


Patient has suicidal ideation: No


Patient has homicidal ideation: No





- Past Medical History


Cardiac Medical History: Reports: Hx Hypertension


Renal/ Medical History: Denies: Hx Peritoneal Dialysis


Psychiatric Medical History: Reports: Hx Anxiety


Past Surgical History: Reports: Hx  Section - 2, Hx Orthopedic Surgery -

Bilateral knees and rib removal





- Immunizations


Hx Diphtheria, Pertussis, Tetanus Vaccination: No





Review of Systems





- Review of Systems


Constitutional: No symptoms reported


EENT: No symptoms reported


Cardiovascular: No symptoms reported


Respiratory: No symptoms reported


Gastrointestinal: See HPI


Genitourinary: See HPI


Female Genitourinary: No symptoms reported


Musculoskeletal: No symptoms reported


Skin: No symptoms reported


Hematologic/Lymphatic: No symptoms reported


Neurological/Psychological: No symptoms reported





Physical Exam





- Vital signs


Vitals: 


                                        











Temp Pulse Resp BP Pulse Ox


 


 97.9 F   85   16   153/86 H  100 


 


 20 12:37  20 12:37  20 12:37  20 12:37  20 12:37














- Notes


Notes: 





PHYSICAL EXAMINATION:





GENERAL: Well-appearing, well-nourished and in no acute distress.





HEAD: Atraumatic, normocephalic.





EYES: Pupils equal round and reactive to light, extraocular movements intact, 

conjunctiva are normal.





ENT: Nares patent, oropharynx clear without exudates.  Moist mucous membranes.





NECK: Normal range of motion, supple without lymphadenopathy





LUNGS: Breath sounds clear to auscultation bilaterally and equal.  No wheezes 

rales or rhonchi.





HEART: Regular rate and rhythm without murmurs





ABDOMEN: Soft, nontender, nondistended abdomen.  No guarding, no rebound.  No 

masses appreciated.





Female : deferred





Musculoskeletal: Normal range of motion, no pitting or edema.  No cyanosis.





NEUROLOGICAL: Cranial nerves grossly intact.  Normal speech, normal gait.  

Normal sensory, motor exams





PSYCH: Normal mood, normal affect.





SKIN: Warm, Dry, normal turgor, no rashes or lesions noted.





Course





- Re-evaluation


Re-evalutation: 





Patient appears well, nontoxic, labs and imaging as recorded below have been 

unremarkable today.  Patient will be treated symptomatically for her chief c

omplaint of urinary urgency.  We will place her on a 3-day course of antibiotics

pending urine culture.  Patient is agreeable to this plan.








Laboratory











  20





  12:50 12:50 13:15


 


WBC  3.7 L  


 


RBC  4.37  


 


Hgb  12.8  


 


Hct  37.3  


 


MCV  85  


 


MCH  29.4  


 


MCHC  34.4  


 


RDW  15.2 H  


 


Plt Count  176  


 


Lymph % (Auto)  29.5  


 


Mono % (Auto)  11.3  


 


Eos % (Auto)  5.5  


 


Baso % (Auto)  0.5  


 


Absolute Neuts (auto)  2.0  


 


Absolute Lymphs (auto)  1.1  


 


Absolute Monos (auto)  0.4  


 


Absolute Eos (auto)  0.2  


 


Absolute Basos (auto)  0.0  


 


Seg Neutrophils %  53.2  


 


Sodium   137.9 


 


Potassium   4.1 


 


Chloride   101 


 


Carbon Dioxide   29 


 


Anion Gap   8 


 


BUN   20 


 


Creatinine   0.72 


 


Est GFR ( Amer)   > 60 


 


Est GFR (MDRD) Non-Af   > 60 


 


Glucose   87 


 


Calcium   9.0 


 


Total Bilirubin   0.4 


 


Direct Bilirubin   0.3 


 


Neonat Total Bilirubin   Not Reportable 


 


Neonat Direct Bilirubin   Not Reportable 


 


Neonat Indirect Bili   Not Reportable 


 


AST   20 


 


ALT   17 


 


Alkaline Phosphatase   81 


 


Total Protein   7.5 


 


Albumin   4.2 


 


Urine Color    YELLOW


 


Urine Appearance    CLEAR


 


Urine pH    5.0


 


Ur Specific Gravity    1.018


 


Urine Protein    NEGATIVE


 


Urine Glucose (UA)    NEGATIVE


 


Urine Ketones    NEGATIVE


 


Urine Blood    SMALL H


 


Urine Nitrite    NEGATIVE


 


Urine Bilirubin    NEGATIVE


 


Urine Urobilinogen    NEGATIVE


 


Ur Leukocyte Esterase    NEGATIVE


 


Urine WBC (Auto)    1


 


Urine RBC (Auto)    2


 


U Hyaline Cast (Auto)    1


 


Squamous Epi Cells Auto    <1


 


Urine Mucus (Auto)    RARE


 


Urine Ascorbic Acid    NEGATIVE


 


Urine HCG, Qual    NEGATIVE











                                        





Abdomen/Pelvis CT  20 13:44


IMPRESSION:  NO SIGNIFICANT OR ACUTE PROCESS IN THE ABDOMEN OR PELVIS.


 








Abdomen Ultrasound  20 14:14


IMPRESSION:  NORMAL RIGHT UPPER QUADRANT ULTRASOUND.


 














- Vital Signs


Vital signs: 


                                        











Temp Pulse Resp BP Pulse Ox


 


 98.2 F   78   16   134/70 H  99 


 


 20 16:33  20 16:33  20 16:33  20 16:33  20 16:33














- Laboratory


Result Diagrams: 


                                 20 12:50





                                 20 12:50


Laboratory results interpreted by me: 


                                        











  20





  12:50 13:15


 


WBC  3.7 L 


 


RDW  15.2 H 


 


Urine Blood   SMALL H














Discharge





- Discharge


Clinical Impression: 


 Flank pain





Condition: Stable


Disposition: HOME, SELF-CARE


Additional Instructions: 


As discussed your work-up today was unremarkable.  I have enclosed copies of 

your labs and imaging for your primary care provider's review.  Please use the 

antibiotics as prescribed.  A urine culture is pending.  We will call you if 

there is any abnormality.  Please continue to take ibuprofen for pain.  Return 

to the emergency department with any new or worsening symptoms.





Prescriptions: 


Sulfamethoxazole/Trimethoprim [Bactrim Ds Tablet] 1 tab PO BID #6 tablet


Referrals: 


VIRGILIO FLORES MD [Primary Care Provider] - Follow up as needed

## 2020-03-01 NOTE — RADIOLOGY REPORT (SQ)
EXAM DESCRIPTION:  CT ABD/PELVIS NO ORAL OR IV



COMPLETED DATE/TIME:  3/1/2020 2:00 pm



REASON FOR STUDY:  R flank pain/hematuria/hx of mass on R side



COMPARISON:  None.



TECHNIQUE:  CT scan of the abdomen and pelvis performed without intravenous or oral contrast. Images 
reviewed with lung, soft tissue, and bone windows. Reconstructed coronal and sagittal MPR images revi
ewed. All images stored on PACS.

All CT scanners at this facility use dose modulation, iterative reconstruction, and/or weight based d
osing when appropriate to reduce radiation dose to as low as reasonably achievable (ALARA).

[facility]: Dose Right  [facility]: CareDose    [facility]: Dose Right    [facility]: Teradose 4D    [facility]: Smart Comuto



RADIATION DOSE:  CT Rad equipment meets quality standard of care and radiation dose reduction techniq
ues were employed. CTDIvol: 21.0 mGy. DLP: 1196 mGy-cm.mGy.



LIMITATIONS:  None.



FINDINGS:  LOWER CHEST: No significant findings. No nodules or infiltrates.

NON-CONTRASTED LIVER, SPLEEN, ADRENALS: Evaluation limited by lack of IV contrast. No identified sign
ificant masses.

PANCREAS: No masses. No peripancreatic inflammatory changes.

GALLBLADDER: No identified stones by CT criteria. No inflammatory changes to suggest cholecystitis.

RIGHT KIDNEY AND URETER: No suspicious masses. Assessment limited by lack of IV contrast.   No signif
icant calcifications.   No hydronephrosis or hydroureter.

LEFT KIDNEY AND URETER: No suspicious masses. Assessment limited by lack of IV contrast.   No signifi
cant calcifications.   No hydronephrosis or hydroureter.

AORTA AND RETROPERITONEUM: No aneurysm. No retroperitoneal masses or adenopathy.

BOWEL AND PERITONEAL CAVITY: No obvious masses or inflammatory changes. No free fluid.

APPENDIX: Normal.

PELVIS, BLADDER, AND ABDOMINAL WALL:No abnormal masses. No free fluid. Bladder normal.

BONES: No significant findings.

OTHER: No other significant finding.



IMPRESSION:  NO SIGNIFICANT OR ACUTE PROCESS IN THE ABDOMEN OR PELVIS.



COMMENT:  Quality ID # [id number]: Final reports with documentation of one or more dose reduction techniques
 (e.g., Automated exposure control, adjustment of the mA and/or kV according to patient size, use of 
iterative reconstruction technique)



TECHNICAL DOCUMENTATION:  JOB ID:  [id number]

 2011 [employer]- All Rights Reserved



Reading location - IP/workstation name: [email or web address]

## 2020-06-21 ENCOUNTER — HOSPITAL ENCOUNTER (EMERGENCY)
Dept: HOSPITAL 62 - ER | Age: 40
Discharge: HOME | End: 2020-06-21
Payer: COMMERCIAL

## 2020-06-21 VITALS — DIASTOLIC BLOOD PRESSURE: 57 MMHG | SYSTOLIC BLOOD PRESSURE: 143 MMHG

## 2020-06-21 DIAGNOSIS — H92.02: ICD-10-CM

## 2020-06-21 DIAGNOSIS — Z88.8: ICD-10-CM

## 2020-06-21 DIAGNOSIS — J02.9: Primary | ICD-10-CM

## 2020-06-21 DIAGNOSIS — I10: ICD-10-CM

## 2020-06-21 DIAGNOSIS — G89.29: ICD-10-CM

## 2020-06-21 DIAGNOSIS — R50.9: ICD-10-CM

## 2020-06-21 DIAGNOSIS — M54.5: ICD-10-CM

## 2020-06-21 DIAGNOSIS — Z88.1: ICD-10-CM

## 2020-06-21 DIAGNOSIS — M54.9: ICD-10-CM

## 2020-06-21 DIAGNOSIS — R11.0: ICD-10-CM

## 2020-06-21 DIAGNOSIS — R42: ICD-10-CM

## 2020-06-21 LAB
ADD MANUAL DIFF: NO
ALBUMIN SERPL-MCNC: 4.2 G/DL (ref 3.5–5)
ALP SERPL-CCNC: 86 U/L (ref 38–126)
ANION GAP SERPL CALC-SCNC: 10 MMOL/L (ref 5–19)
APPEARANCE UR: CLEAR
APTT PPP: YELLOW S
AST SERPL-CCNC: 23 U/L (ref 14–36)
BASOPHILS # BLD AUTO: 0 10^3/UL (ref 0–0.2)
BASOPHILS NFR BLD AUTO: 0.2 % (ref 0–2)
BILIRUB DIRECT SERPL-MCNC: 0 MG/DL (ref 0–0.4)
BILIRUB SERPL-MCNC: 0.4 MG/DL (ref 0.2–1.3)
BILIRUB UR QL STRIP: NEGATIVE
BUN SERPL-MCNC: 22 MG/DL (ref 7–20)
CALCIUM: 9 MG/DL (ref 8.4–10.2)
CHLORIDE SERPL-SCNC: 100 MMOL/L (ref 98–107)
CO2 SERPL-SCNC: 25 MMOL/L (ref 22–30)
EOSINOPHIL # BLD AUTO: 0 10^3/UL (ref 0–0.6)
EOSINOPHIL NFR BLD AUTO: 0.6 % (ref 0–6)
ERYTHROCYTE [DISTWIDTH] IN BLOOD BY AUTOMATED COUNT: 14.9 % (ref 11.5–14)
GLUCOSE SERPL-MCNC: 85 MG/DL (ref 75–110)
GLUCOSE UR STRIP-MCNC: NEGATIVE MG/DL
HCT VFR BLD CALC: 37.3 % (ref 36–47)
HGB BLD-MCNC: 12.2 G/DL (ref 12–15.5)
KETONES UR STRIP-MCNC: NEGATIVE MG/DL
LYMPHOCYTES # BLD AUTO: 0.6 10^3/UL (ref 0.5–4.7)
LYMPHOCYTES NFR BLD AUTO: 14.2 % (ref 13–45)
MCH RBC QN AUTO: 28.5 PG (ref 27–33.4)
MCHC RBC AUTO-ENTMCNC: 32.8 G/DL (ref 32–36)
MCV RBC AUTO: 87 FL (ref 80–97)
MONOCYTES # BLD AUTO: 0.5 10^3/UL (ref 0.1–1.4)
MONOCYTES NFR BLD AUTO: 10.2 % (ref 3–13)
NEUTROPHILS # BLD AUTO: 3.4 10^3/UL (ref 1.7–8.2)
NEUTS SEG NFR BLD AUTO: 74.8 % (ref 42–78)
NITRITE UR QL STRIP: NEGATIVE
PH UR STRIP: 5 [PH] (ref 5–9)
PLATELET # BLD: 158 10^3/UL (ref 150–450)
POTASSIUM SERPL-SCNC: 3.8 MMOL/L (ref 3.6–5)
PROT SERPL-MCNC: 7.2 G/DL (ref 6.3–8.2)
PROT UR STRIP-MCNC: NEGATIVE MG/DL
RBC # BLD AUTO: 4.3 10^6/UL (ref 3.72–5.28)
SP GR UR STRIP: 1.02
TOTAL CELLS COUNTED % (AUTO): 100 %
UROBILINOGEN UR-MCNC: NEGATIVE MG/DL (ref ?–2)
WBC # BLD AUTO: 4.5 10^3/UL (ref 4–10.5)

## 2020-06-21 PROCEDURE — 99283 EMERGENCY DEPT VISIT LOW MDM: CPT

## 2020-06-21 PROCEDURE — 85025 COMPLETE CBC W/AUTO DIFF WBC: CPT

## 2020-06-21 PROCEDURE — 80053 COMPREHEN METABOLIC PANEL: CPT

## 2020-06-21 PROCEDURE — 36415 COLL VENOUS BLD VENIPUNCTURE: CPT

## 2020-06-21 PROCEDURE — 81001 URINALYSIS AUTO W/SCOPE: CPT

## 2020-06-21 PROCEDURE — 87070 CULTURE OTHR SPECIMN AEROBIC: CPT

## 2020-06-21 PROCEDURE — S0119 ONDANSETRON 4 MG: HCPCS

## 2020-06-21 PROCEDURE — 96372 THER/PROPH/DIAG INJ SC/IM: CPT

## 2020-06-21 PROCEDURE — 87880 STREP A ASSAY W/OPTIC: CPT

## 2020-06-21 NOTE — ER DOCUMENT REPORT
ED General





- General


Chief Complaint: Back Pain


Stated Complaint: FEVER AND EARACHE


Time Seen by Provider: 20 02:50


Primary Care Provider: 


VIRGILIO FLORES MD [Primary Care Provider] - Follow up as needed


Notes: 





39-year-old female with past medical history of hypertension and chronic low 

back pain presenting with acute onset of worsening low back pain, left ear pain,

sore throat and fever.  Patient is uncomfortable laying down.  Needs to sit up 

to help alleviate her pain.  States her symptoms started at work.  She denies 

any radiculopathy or saddle anesthesia.  She took an ibuprofen 800 mg and around

8 PM.  States that helped alleviate her symptoms briefly.  She continues to have

sore throat and back pain.  No continued ear pain.  Patient also reports feeling

lightheaded and nauseous.  She denies any shortness of breath, chest pain, 

abdominal pain, diarrhea or additional symptoms.  Patient denies any tobacco, 

alcohol or recreational drug use.  Denies history of IV drug abuse/use.


TRAVEL OUTSIDE OF THE [place] IN LAST 30 DAYS: No





- Related Data


Allergies/Adverse Reactions: 


                                        





codeine Allergy (Verified 20 02:36)


   


erythromycin base Allergy (Verified 20 02:36)


   











Past Medical History





- Social History


Smoking Status: Never Smoker


Frequency of alcohol use: None


Drug Abuse: None


Family History: Reviewed & Not Pertinent


Patient has homicidal ideation: No





- Past Medical History


Cardiac Medical History: Reports: Hx Hypertension


Renal/ Medical History: Denies: Hx Peritoneal Dialysis


Psychiatric Medical History: Reports: Hx Anxiety, Hx Depression


Past Surgical History: Reports: Hx  Section - 2, Hx Orthopedic Surgery -

Bilateral knees and rib removal





- Immunizations


Hx Diphtheria, Pertussis, Tetanus Vaccination: No





Review of Systems





- Review of Systems


Constitutional: See HPI


EENT: See HPI


Cardiovascular: No symptoms reported


Respiratory: No symptoms reported


Gastrointestinal: See HPI


Genitourinary: No symptoms reported


Female Genitourinary: No symptoms reported


Musculoskeletal: See HPI


Skin: No symptoms reported





Physical Exam





- Vital signs


Vitals: 


                                        











Temp Pulse Resp BP Pulse Ox


 


 98.2 F   105 H  21 H  136/54 H  95 


 


 20 02:38  20 02:38  20 02:38  20 02:38  20 02:38











Interpretation: Normal





- Notes


Notes: 





Adult General:





GENERAL: Alert, interacts well. No acute distress


HEAD: Normocephalic, atraumatic


EYES: Pupils equal, round and reactive to light. Extraocular movements intact.


ENT: Oral mucosa moist, tongue midline.  Oropharynx unremarkable.  Airway 

patent.  Nares patent.


NECK: Full range of motion.  Supple.  Trachea midline.  Mild tenderness to left 

throat. No lymphadenopathy. No cervical tenderness or nuchal rigidity. 


LUNGS: Clear to auscultation bilaterally, no wheezes, rales, or rhonchi.  No 

respiratory distress.  Nontender chest wall.


HEART: Regular rate and rhythm.  No murmurs, rubs or gallops.


ABDOMEN: Soft, nontender.  Nondistended. (-) muprhy's sign. Bowel sounds present

in all 4 quadrants.


GENITOURINARY: Deferred


EXTREMITIES: Moves all 4 extremities spontaneously.


BACK: No cervical, thoracic, lumbar midline tenderness.  Back is tender along 

lumbar paraspinals. No saddle anesthesia, normal distal neurovascular exam.  

Moves all extremities with full range of motion.


NEUROLOGICAL: Alert and oriented x3.  Normal speech.  Strength 5/ 5 in all 

extremities.


PSYCH: Normal affect, normal mood.


SKIN: Warm, dry, normal turgor.  No rashes or lesions noted.





Course





- Re-evaluation


Re-evalutation: 





20 04:41


Patient was reevaluated.  Patient is well appearing. Patient reports that after 

receiving the pain medication, Toradol she states that her back feels much 

better.  Patient is sitting comfortably in her chair.  Pending lab results at 

this time.


20 06:45


Labs are unremarkable.  Patient continues to state that she feels much better 

since having the Toradol injection.  Continues to report a sore throat. Strep is

negative. I will go ahead and give dexamethasone.  Suspect patient's symptoms 

are due to a viral etiology at this time due to labs being unremarkable. I 

discussed with patient the findings of her lab results and the likely viral 

etiology of her symptoms.  Recommend continued use of Tylenol and ibuprofen to 

help alleviate pain and manage fever.  I did discuss return precautions with the

patient to include worsening symptoms or development of new symptoms.  Patient 

acknowledges and verbalizes understanding of instructions and plan.  All 

questions answered.











- Vital Signs


Vital signs: 


                                        











Temp Pulse Resp BP Pulse Ox


 


 98.3 F   89   16   143/57 H  100 


 


 20 05:30  20 05:30  20 05:30  20 05:30  20 05:30














- Laboratory


Result Diagrams: 


                                 20 03:43





                                 20 03:43


Laboratory results interpreted by me: 


                                        











  20





  03:43 03:43


 


RDW  14.9 H 


 


Sodium   134.8 L


 


BUN   22 H














Discharge





- Discharge


Clinical Impression: 


 Sore throat





Back pain


Qualifiers:


 Back pain location: low back pain Chronicity: chronic Back pain laterality: 

bilateral Sciatica presence: without sciatica Qualified Code(s): M54.5 - Low 

back pain





Condition: Stable


Disposition: HOME, SELF-CARE


Instructions:  Acetaminophen, Low Back Pain ([facility]), Sore Throat ([facility]), Viral 

Syndrome ([facility])


Forms:  Return to Work


Referrals: 


VIRGILIO FLORES MD [Primary Care Provider] - Follow up as needed

## 2020-06-24 ENCOUNTER — HOSPITAL ENCOUNTER (EMERGENCY)
Dept: HOSPITAL 62 - ER | Age: 40
Discharge: HOME | End: 2020-06-24
Payer: COMMERCIAL

## 2020-06-24 VITALS — DIASTOLIC BLOOD PRESSURE: 77 MMHG | SYSTOLIC BLOOD PRESSURE: 129 MMHG

## 2020-06-24 DIAGNOSIS — R50.9: ICD-10-CM

## 2020-06-24 DIAGNOSIS — Z88.6: ICD-10-CM

## 2020-06-24 DIAGNOSIS — R11.2: ICD-10-CM

## 2020-06-24 DIAGNOSIS — Z20.828: ICD-10-CM

## 2020-06-24 DIAGNOSIS — I10: ICD-10-CM

## 2020-06-24 DIAGNOSIS — B00.2: Primary | ICD-10-CM

## 2020-06-24 DIAGNOSIS — R00.0: ICD-10-CM

## 2020-06-24 DIAGNOSIS — Z88.3: ICD-10-CM

## 2020-06-24 LAB
A TYPE INFLUENZA AG: NEGATIVE
ADD MANUAL DIFF: NO
ALBUMIN SERPL-MCNC: 4.2 G/DL (ref 3.5–5)
ALP SERPL-CCNC: 70 U/L (ref 38–126)
ANION GAP SERPL CALC-SCNC: 9 MMOL/L (ref 5–19)
AST SERPL-CCNC: 28 U/L (ref 14–36)
B INFLUENZA AG: NEGATIVE
BASE EXCESS BLDV CALC-SCNC: -0.8 MMOL/L
BASOPHILS # BLD AUTO: 0 10^3/UL (ref 0–0.2)
BASOPHILS NFR BLD AUTO: 0.3 % (ref 0–2)
BILIRUB DIRECT SERPL-MCNC: 0.1 MG/DL (ref 0–0.4)
BILIRUB SERPL-MCNC: 0.6 MG/DL (ref 0.2–1.3)
BUN SERPL-MCNC: 16 MG/DL (ref 7–20)
CALCIUM: 8.6 MG/DL (ref 8.4–10.2)
CHLORIDE SERPL-SCNC: 100 MMOL/L (ref 98–107)
CO2 SERPL-SCNC: 26 MMOL/L (ref 22–30)
EOSINOPHIL # BLD AUTO: 0 10^3/UL (ref 0–0.6)
EOSINOPHIL NFR BLD AUTO: 0 % (ref 0–6)
ERYTHROCYTE [DISTWIDTH] IN BLOOD BY AUTOMATED COUNT: 15.4 % (ref 11.5–14)
GLUCOSE SERPL-MCNC: 110 MG/DL (ref 75–110)
HCO3 BLDV-SCNC: 24.6 MMOL/L (ref 20–32)
HCT VFR BLD CALC: 40.3 % (ref 36–47)
HGB BLD-MCNC: 13.4 G/DL (ref 12–15.5)
INR PPP: 1.06
LYMPHOCYTES # BLD AUTO: 0.6 10^3/UL (ref 0.5–4.7)
LYMPHOCYTES NFR BLD AUTO: 9.7 % (ref 13–45)
MCH RBC QN AUTO: 28.5 PG (ref 27–33.4)
MCHC RBC AUTO-ENTMCNC: 33.1 G/DL (ref 32–36)
MCV RBC AUTO: 86 FL (ref 80–97)
MONOCYTES # BLD AUTO: 0.6 10^3/UL (ref 0.1–1.4)
MONOCYTES NFR BLD AUTO: 9.2 % (ref 3–13)
NEUTROPHILS # BLD AUTO: 4.8 10^3/UL (ref 1.7–8.2)
NEUTS SEG NFR BLD AUTO: 80.8 % (ref 42–78)
PCO2 BLDV: 43.6 MMHG (ref 35–63)
PH BLDV: 7.37 [PH] (ref 7.3–7.42)
PLATELET # BLD: 174 10^3/UL (ref 150–450)
POTASSIUM SERPL-SCNC: 4 MMOL/L (ref 3.6–5)
PROT SERPL-MCNC: 8.1 G/DL (ref 6.3–8.2)
PROTHROMBIN TIME: 13.8 SEC (ref 11.4–15.4)
RBC # BLD AUTO: 4.69 10^6/UL (ref 3.72–5.28)
TOTAL CELLS COUNTED % (AUTO): 100 %
WBC # BLD AUTO: 6 10^3/UL (ref 4–10.5)

## 2020-06-24 PROCEDURE — 93010 ELECTROCARDIOGRAM REPORT: CPT

## 2020-06-24 PROCEDURE — 96361 HYDRATE IV INFUSION ADD-ON: CPT

## 2020-06-24 PROCEDURE — 87804 INFLUENZA ASSAY W/OPTIC: CPT

## 2020-06-24 PROCEDURE — 36415 COLL VENOUS BLD VENIPUNCTURE: CPT

## 2020-06-24 PROCEDURE — 84703 CHORIONIC GONADOTROPIN ASSAY: CPT

## 2020-06-24 PROCEDURE — 87635 SARS-COV-2 COVID-19 AMP PRB: CPT

## 2020-06-24 PROCEDURE — 85025 COMPLETE CBC W/AUTO DIFF WBC: CPT

## 2020-06-24 PROCEDURE — 96374 THER/PROPH/DIAG INJ IV PUSH: CPT

## 2020-06-24 PROCEDURE — 85610 PROTHROMBIN TIME: CPT

## 2020-06-24 PROCEDURE — C9803 HOPD COVID-19 SPEC COLLECT: HCPCS

## 2020-06-24 PROCEDURE — 99284 EMERGENCY DEPT VISIT MOD MDM: CPT

## 2020-06-24 PROCEDURE — 86308 HETEROPHILE ANTIBODY SCREEN: CPT

## 2020-06-24 PROCEDURE — 87040 BLOOD CULTURE FOR BACTERIA: CPT

## 2020-06-24 PROCEDURE — 71045 X-RAY EXAM CHEST 1 VIEW: CPT

## 2020-06-24 PROCEDURE — 82803 BLOOD GASES ANY COMBINATION: CPT

## 2020-06-24 PROCEDURE — 93005 ELECTROCARDIOGRAM TRACING: CPT

## 2020-06-24 PROCEDURE — 80053 COMPREHEN METABOLIC PANEL: CPT

## 2020-06-24 PROCEDURE — 83605 ASSAY OF LACTIC ACID: CPT

## 2020-06-24 NOTE — ER DOCUMENT REPORT
ED Fever





- General


Chief Complaint: Fever


Stated Complaint: NAUSEA/VOMITING/FEVER/CHILLS


Time Seen by Provider: 20 09:21


Primary Care Provider: 


VIRGILIO FLORES MD [Primary Care Provider] - Follow up tomorrow


Mode of Arrival: Ambulatory


Information source: Patient


Notes: 





Patient presents complaining of sore throat and fever for the past 5 days.  

Patient does report recent exposure to someone at home with strep pharyngitis.  

Patient was seen here on 2020 and diagnosed with sore throat.  Patient went

to an urgent care 3 days ago and had a negative strep test although they did 

place her on amoxicillin due to her recent positive exposure.  Patient reports 

fever of 103 today.  Patient had nausea vomiting that started yesterday.  

Patient states she is vomited 6 times today.  Patient denies any diarrhea.  

Patient also complains of left ear pain and multiple sores in her mouth.


TRAVEL OUTSIDE OF THE [place] IN LAST 30 DAYS: No





- HPI


Onset: Other - 5 days


Onset/Duration: Worse


Quality of pain: Burning


Pain Level: 5


Context: Nausea/vomiting.  denies: Cough


Associated symptoms: Earache, Fever, Nausea, Vomiting, Sore throat.  denies: 

Nonproductive cough, Productive cough


Similar symptoms previously: No


Recently seen / treated by doctor: Yes





- Related Data


Allergies/Adverse Reactions: 


                                        





codeine Allergy (Verified 20 08:40)


   


erythromycin base Allergy (Verified 20 08:40)


   











Past Medical History





- General


Information source: Patient





- Social History


Smoking Status: Never Smoker


Frequency of alcohol use: None


Drug Abuse: None


Occupation: Foodservice


Lives with: Family


Family History: Reviewed & Not Pertinent


Patient has homicidal ideation: No





- Past Medical History


Cardiac Medical History: Reports: Hx Hypertension


Renal/ Medical History: Denies: Hx Peritoneal Dialysis


Psychiatric Medical History: Reports: Hx Anxiety, Hx Depression


Past Surgical History: Reports: Hx  Section - 2, Hx Orthopedic Surgery -

Bilateral knees and rib removal





- Immunizations


Hx Diphtheria, Pertussis, Tetanus Vaccination: No





Review of Systems





- Review of Systems


Constitutional: Fever


EENT: Ear pain, Throat pain


Cardiovascular: No symptoms reported.  denies: Chest pain


Respiratory: No symptoms reported.  denies: Cough, Short of breath


Gastrointestinal: Nausea, Vomiting.  denies: Abdominal pain, Diarrhea


Genitourinary: No symptoms reported.  denies: Dysuria


Female Genitourinary: No symptoms reported


Musculoskeletal: No symptoms reported.  denies: Back pain


Skin: No symptoms reported


Hematologic/Lymphatic: No symptoms reported


Neurological/Psychological: No symptoms reported





Physical Exam





- Vital signs


Vitals: 


                                        











Temp


 


 102.7 F H


 


 20 08:34














- General


General appearance: Alert


In distress: None





- HEENT


Head: Normocephalic, Atraumatic


Eyes: Normal


Conjunctiva: Normal


Ears: Normal


External canal: Normal


Tympanic membrane: Normal


Nasal: Normal


Mouth/Lips: Lesions


Mucous membranes: Normal


Pharynx: Erythema


Neck: Normal, Supple.  No: Lymphadenopathy





- Respiratory


Respiratory status: No respiratory distress


Chest status: Nontender


Breath sounds: Normal.  No: Rales, Rhonchi, Stridor, Wheezing


Chest palpation: Normal





- Cardiovascular


Rhythm: Tachycardia


Heart sounds: S1 appreciated, S2 appreciated





- Abdominal


Inspection: Morbidly Obese


Distension: No distension


Bowel sounds: Normal


Tenderness: Nontender


Organomegaly: No organomegaly





- Back


Back: Normal, Nontender.  No: CVA tenderness





- Extremities


General upper extremity: Normal inspection, Normal strength


General lower extremity: Normal inspection, Normal strength





- Neurological


Neuro grossly intact: Yes


Cognition: Normal


[place] Coma Scale Eye Opening: Spontaneous


Zi Coma Scale Verbal: Oriented


Zi Coma Scale Motor: Obeys Commands


[place] Coma Scale Total: 15





- Psychological


Associated symptoms: Normal affect, Normal mood





- Skin


Skin Temperature: Warm


Skin Moisture: Dry


Skin Color: Normal





Course





- Re-evaluation


Re-evalutation: 





20 13:20


Patient clinically appears to feel much better.  Patient without any 

leukocytosis or elevated lactic acid.  Patient with what appears to be herpetic 

stomatitis.  We will additionally test patient for the coronavirus.  Patient's 

nausea vomiting have resolved.  Patient nontoxic in appearance, no concern for 

sepsis at this time.  Good return precautions discussed with patient.  Patient 

verbalized understanding and is agreeable with discharge plan of care at this 

time.








- Vital Signs


Vital signs: 


                                        











Temp Pulse Resp BP Pulse Ox


 


 98.2 F   84   18   129/77 H  98 


 


 20 13:35  20 13:35  20 13:35  20 13:35  20 13:35














- Laboratory


Result Diagrams: 


                                 20 09:30





                                 20 09:30


Laboratory results interpreted by me: 


                                        











  20





  09:30 09:30 11:05


 


RDW  15.4 H  


 


Lymph % (Auto)  9.7 L  


 


Seg Neutrophils %  80.8 H  


 


Sodium   134.9 L 


 


Lactic Acid    0.5 L














- Diagnostic Test


Radiology reviewed: Reports reviewed





- EKG Interpretation by Me


EKG shows normal: Sinus rhythm


Rate: Normal


Additional EKG results interpreted by me: 





20 19:33


Sinus rhythm of 96 with a QTC of 440, no acute ischemic changes





Discharge





- Discharge


Clinical Impression: 


 Herpes stomatitis





Fever


Qualifiers:


 Fever type: unspecified Qualified Code(s): R50.9 - Fever, unspecified





Nausea and vomiting


Qualifiers:


 Vomiting type: unspecified Vomiting Intractability: non-intractable Qualified 

Code(s): R11.2 - Nausea with vomiting, unspecified





Condition: Stable


Disposition: HOME, SELF-CARE


Instructions:  Acetaminophen, Antinausea Medication ([facility]), Fever ([facility]), 

Intravenous (IV) Fluids ([facility]), Vomiting ([facility])


Additional Instructions: 


Return immediately for any new or worsening symptoms





Followup with your primary care provider, call tomorrow to make a followup 

appointment





Home quarantine as instructed per COVID instructions





Increase oral fluids and stay well-hydrated


Prescriptions: 


Valacyclovir HCl [Valtrex] 1,000 mg PO BID #28 tablet


Ondansetron [Zofran Odt 4 mg Tablet] 1 tab PO Q6H #15 tab.rapdis


Forms:  Return to Work


Referrals: 


VIRGILIO FLORES MD [Primary Care Provider] - Follow up tomorrow

## 2020-06-24 NOTE — RADIOLOGY REPORT (SQ)
EXAM DESCRIPTION:  CHEST SINGLE VIEW



IMAGES COMPLETED DATE/TIME:  6/24/2020 10:46 am



REASON FOR STUDY:  fever



COMPARISON:  3/22/2019



EXAM PARAMETERS:  NUMBER OF VIEWS: One view.

TECHNIQUE: Single frontal radiographic view of the chest acquired.

RADIATION DOSE: NA

LIMITATIONS: None.



FINDINGS:  LUNGS AND PLEURA: No opacities, masses or pneumothorax. No pleural effusion.

MEDIASTINUM AND HILAR STRUCTURES: No masses.  Contour normal.

HEART AND VASCULAR STRUCTURES: Heart normal in size.  Normal vasculature.

BONES: No acute findings.

HARDWARE: None in the chest.

OTHER: No other significant finding.



IMPRESSION:  1. NO ACUTE RADIOGRAPHIC FINDING IN THE CHEST.



TECHNICAL DOCUMENTATION:  JOB ID:  [id number]

 2011 [employer]- All Rights Reserved



Reading location - IP/workstation name: [email or web address]

## 2020-06-24 NOTE — EKG REPORT
SEVERITY:- BORDERLINE ECG -

SINUS RHYTHM

BORDERLINE R WAVE PROGRESSION, ANTERIOR LEADS

:

Confirmed by: Beatriz Peace 24-Jun-2020 13:18:57

## 2021-05-13 NOTE — ER DOCUMENT REPORT
ED Medical Screen (RME)





- General


Stated Complaint: CHEST PAIN,VOMITING,LEFT ARM PAIN


Time Seen by Provider: 19 21:54


Mode of Arrival: Ambulatory


Information source: Patient


Notes: 





Patient is a 38-year-old female who presents the emergency department with chest

pain that started around 8:30 PM.  Patient reports she has been having chest 

pain intermittently over the last 3 weeks however this was much worse.  She 

states she is seen her PCP and has a pending referral to cardiology.  Patient 

states that tonight the pain feels like a sharp stabbing pain in the middle of 

her chest with radiation to the left chest and left arm.  She also reports 

associated numbness in her left hand and fingers.  Patient denies any history of

myocardial infarction, states she does have a history of hypertension and is a 

non-smoker.





TRAVEL OUTSIDE OF THE [place] IN LAST 30 DAYS: No





- Related Data


Allergies/Adverse Reactions: 


                                        





codeine Allergy (Verified 17 15:34)


   











Past Medical History





- Past Medical History


Cardiac Medical History: Reports: Hx Hypertension


Renal/ Medical History: Denies: Hx Peritoneal Dialysis


Psychiatric Medical History: Reports: Hx Anxiety


Past Surgical History: Reports: Hx  Section - 2, Hx Orthopedic Surgery -

Bilateral knees and rib removal





- Immunizations


Hx Diphtheria, Pertussis, Tetanus Vaccination: No Reginald Osei(Attending)